# Patient Record
Sex: FEMALE | URBAN - METROPOLITAN AREA
[De-identification: names, ages, dates, MRNs, and addresses within clinical notes are randomized per-mention and may not be internally consistent; named-entity substitution may affect disease eponyms.]

---

## 2019-07-31 ENCOUNTER — HOSPITAL ENCOUNTER (OUTPATIENT)
Dept: TELEMEDICINE | Facility: HOSPITAL | Age: 54
Discharge: HOME | End: 2019-07-31

## 2019-07-31 DIAGNOSIS — I63.411 CEREBRAL INFARCTION DUE TO EMBOLISM OF RIGHT MIDDLE CEREBRAL ARTERY: ICD-10-CM

## 2019-07-31 PROCEDURE — G0425 INPT/ED TELECONSULT30: HCPCS | Mod: GT,G0,, | Performed by: PSYCHIATRY & NEUROLOGY

## 2019-07-31 PROCEDURE — G0425 PR INPT TELEHEALTH CONSULT 30M: ICD-10-PCS | Mod: GT,G0,, | Performed by: PSYCHIATRY & NEUROLOGY

## 2019-08-01 NOTE — SUBJECTIVE & OBJECTIVE
Woke up with symptoms?: no    Recent bleeding noted: unknown  Does the patient take any Blood Thinners? yes  Medications: Antiplatelets:  clopidogrel/Plavix      Past Medical History: hypertension, diabetes, hyperlipidemia, MI/CAD, CHF and Heart Problems    Past Surgical History: no major surgeries within the last 2 weeks    Family History: diabetes    Social History: no smoking, no drinking, no drugs    Allergies: Allergies have not been reviewed No relevant allergies    Review of Systems   Constitutional: Negative.    HENT: Negative.    Eyes: Negative.    Neurological: Positive for weakness.   All other systems reviewed and are negative.    Objective:    CT READ: Yes  No hemmorhage. No mass effect. No early infarct signs.      Physical Exam   Constitutional: She appears well-developed.   HENT:   Head: Normocephalic.   Eyes: Pupils are equal, round, and reactive to light.   Neck: Normal range of motion.   Cardiovascular: Normal rate.   Pulmonary/Chest: Effort normal.   Abdominal: Soft.   Neurological: She is alert. She displays normal reflexes. No cranial nerve deficit. She exhibits normal muscle tone. Coordination normal.

## 2019-08-01 NOTE — ASSESSMENT & PLAN NOTE
SEE HPI    Patient with mild left HP. Small vessel vs embolic stroke. Not intervention candidate.   Antithrombotics for secondary stroke prevention: Antiplatelets: Clopidogrel: 75 mg daily    Statins for secondary stroke prevention and hyperlipidemia, if present:   Statins: Atorvastatin- 80 mg daily    Aggressive risk factor modification: HTN, DM, HLD, Diet, Exercise, Obesity, CAD     Rehab efforts: The patient has been evaluated by a stroke team provider and the therapy needs have been fully considered based off the presenting complaints and exam findings. The following therapy evaluations are needed: PT evaluate and treat, OT evaluate and treat, PM&R evaluate for appropriate placement    Diagnostics ordered/pending: Carotid ultrasound to assess vasculature, HgbA1C to assess blood glucose levels, Lipid Profile to assess cholesterol levels, MRA head to assess vasculature, MRA neck/arch to assess vasculature, MRI head without contrast to assess brain parenchyma, TTE to assess cardiac function/status     VTE prophylaxis: Heparin 5000 units SQ every 8 hours    BP parameters: Infarct: No intervention, SBP <220

## 2019-08-01 NOTE — CONSULTS
[facility]  Vascular Neurology  Comprehensive Stroke Center  Tele-Consultation Note      Consults    Consulting Provider: VIRIDIANA JIANG  Current Providers  No providers found    Patient Location: [facility] Emergency Department  Spoke hospital nurse at bedside with patient assisting consultant.     Patient information was obtained from patient and relative(s).         Assessment/Plan:     STROKE DOCUMENTATION     Acute Stroke Times:   Acute Stroke Times   Last Known Normal Date: 07/31/19  Last Known Normal Time: 2000  Stroke Team Arrival Date: 07/31/19  Stroke Team Arrival Time: 2231  CT Interpretation Time: 2239    NIH Scale:  1a. Level of Consciousness: 0-->Alert, keenly responsive  1b. LOC Questions: 0-->Answers both questions correctly  1c. LOC Commands: 0-->Performs both tasks correctly  2. Best Gaze: 0-->Normal  3. Visual: 0-->No visual loss  4. Facial Palsy: 0-->Normal symmetrical movements  5a. Motor Arm, Left: 0-->No drift, limb holds 90 (or 45) degrees for full 10 secs  5b. Motor Arm, Right: 0-->No drift, limb holds 90 (or 45) degrees for full 10 secs  6a. Motor Leg, Left: 0-->No drift, leg holds 30 degree position for full 5 secs  6b. Motor Leg, Right: 0-->No drift, leg holds 30 degree position for full 5 secs  7. Limb Ataxia: 0-->Absent  8. Sensory: 0-->Normal, no sensory loss  9. Best Language: 0-->No aphasia, normal  10. Dysarthria: 0-->Normal  11. Extinction and Inattention (formerly Neglect): 0-->No abnormality  Total (NIH Stroke Scale): 0     Modified [place]    [place] Coma Scale:    ABCD2 Score:    [id number] Score:   HAS -BLED Score:   ICH Score:   Hunt & Calabrese Classification:       Diagnoses:   Cerebral infarction due to embolism of right middle cerebral artery  SEE HPI    Patient with mild left HP. Small vessel vs embolic stroke. Not intervention candidate.   Antithrombotics for secondary stroke  prevention: Antiplatelets: Clopidogrel: 75 mg daily    Statins for secondary stroke prevention and hyperlipidemia, if present:   Statins: Atorvastatin- 80 mg daily    Aggressive risk factor modification: HTN, DM, HLD, Diet, Exercise, Obesity, CAD     Rehab efforts: The patient has been evaluated by a stroke team provider and the therapy needs have been fully considered based off the presenting complaints and exam findings. The following therapy evaluations are needed: PT evaluate and treat, OT evaluate and treat, PM&R evaluate for appropriate placement    Diagnostics ordered/pending: Carotid ultrasound to assess vasculature, HgbA1C to assess blood glucose levels, Lipid Profile to assess cholesterol levels, MRA head to assess vasculature, MRA neck/arch to assess vasculature, MRI head without contrast to assess brain parenchyma, TTE to assess cardiac function/status     VTE prophylaxis: Heparin 5000 units SQ every 8 hours    BP parameters: Infarct: No intervention, SBP <220            There were no vitals taken for this visit.  Alteplase Eligible?: No  Alteplase Recommendation: Alteplase not recommended due to minimal deficit   Possible Interventional Revascularization Candidate? No; No significant neurological deficit    Disposition Recommendation: admit to inpatient  do not transfer    Subjective:     History of Present Illness:  53 year old presents  with Mild L HP  Hx Morbid obesity, DM2, HTN, HLD, Stroke. CAD  LSN at 2000 when mild L HP came on. Exam too mild to interevene. CT head no acute changes, but extensive b/l white matter disease      Woke up with symptoms?: no    Recent bleeding noted: unknown  Does the patient take any Blood Thinners? yes  Medications: Antiplatelets:  clopidogrel/Plavix      Past Medical History: hypertension, diabetes, hyperlipidemia, MI/CAD, CHF and Heart Problems    Past Surgical History: no major surgeries within the last 2 weeks    Family History: diabetes    Social History: no  smoking, no drinking, no drugs    Allergies: Allergies have not been reviewed No relevant allergies    Review of Systems   Constitutional: Negative.    HENT: Negative.    Eyes: Negative.    Neurological: Positive for weakness.   All other systems reviewed and are negative.    Objective:    CT READ: Yes  No hemmorhage. No mass effect. No early infarct signs.      Physical Exam   Constitutional: She appears well-developed.   HENT:   Head: Normocephalic.   Eyes: Pupils are equal, round, and reactive to light.   Neck: Normal range of motion.   Cardiovascular: Normal rate.   Pulmonary/Chest: Effort normal.   Abdominal: Soft.   Neurological: She is alert. She displays normal reflexes. No cranial nerve deficit. She exhibits normal muscle tone. Coordination normal.             Recommended the emergency room physician to have a brief discussion with the patient and/or family if available regarding the risks and benefits of treatment, and to briefly document the occurrence of that discussion in his clinical encounter note.     The attending portion of this evaluation, treatment, and documentation was performed per Forest Woodard MD via audiovisual.    Billing code:  (moderate to severe stroke, large areas of edema, some mimics)    · This patient has a critical neurological condition/illness, with high morbidity and mortality.  · There is a high probability for acute neurological change leading to clinical and possibly life-threatening deterioration requiring highest level of physician preparedness for urgent intervention.  · Care was coordinated with other physicians involved in the patient's care.  · Radiologic studies and laboratory data were reviewed and interpreted, and plan of care was re-assessed based on the results.  · Diagnosis, treatment options and prognosis may have been discussed with the patient and/or family members or caregiver.  · Further advanced medical management and further evaluation is  warranted for his care.      In your opinion, this was a: Tier 1 Van Positive    Consult End Time: 10:55 PM     Forest Woodard MD  Comprehensive Stroke Center  Vascular Neurology   [facility]

## 2019-08-01 NOTE — HPI
53 year old presents  with Mild L HP  Hx Morbid obesity, DM2, HTN, HLD, Stroke. CAD  LSN at 2000 when mild L HP came on. Exam too mild to interevene. CT head no acute changes, but extensive b/l white matter disease